# Patient Record
Sex: FEMALE | Race: WHITE | HISPANIC OR LATINO | Employment: OTHER | URBAN - METROPOLITAN AREA
[De-identification: names, ages, dates, MRNs, and addresses within clinical notes are randomized per-mention and may not be internally consistent; named-entity substitution may affect disease eponyms.]

---

## 2022-04-27 ENCOUNTER — OFFICE VISIT (OUTPATIENT)
Dept: HEPATOLOGY | Facility: CLINIC | Age: 67
End: 2022-04-27
Payer: MEDICARE

## 2022-04-27 VITALS
BODY MASS INDEX: 25.97 KG/M2 | HEART RATE: 96 BPM | DIASTOLIC BLOOD PRESSURE: 82 MMHG | HEIGHT: 57 IN | SYSTOLIC BLOOD PRESSURE: 110 MMHG | WEIGHT: 120.38 LBS

## 2022-04-27 DIAGNOSIS — K74.60 LIVER CIRRHOSIS SECONDARY TO NASH: Primary | ICD-10-CM

## 2022-04-27 DIAGNOSIS — K75.81 LIVER CIRRHOSIS SECONDARY TO NASH: Primary | ICD-10-CM

## 2022-04-27 DIAGNOSIS — I85.10 SECONDARY ESOPHAGEAL VARICES WITHOUT BLEEDING: ICD-10-CM

## 2022-04-27 PROCEDURE — 1159F PR MEDICATION LIST DOCUMENTED IN MEDICAL RECORD: ICD-10-PCS | Mod: CPTII,S$GLB,, | Performed by: INTERNAL MEDICINE

## 2022-04-27 PROCEDURE — 1101F PT FALLS ASSESS-DOCD LE1/YR: CPT | Mod: CPTII,S$GLB,, | Performed by: INTERNAL MEDICINE

## 2022-04-27 PROCEDURE — 3288F FALL RISK ASSESSMENT DOCD: CPT | Mod: CPTII,S$GLB,, | Performed by: INTERNAL MEDICINE

## 2022-04-27 PROCEDURE — 99999 PR PBB SHADOW E&M-NEW PATIENT-LVL IV: ICD-10-PCS | Mod: PBBFAC,,, | Performed by: INTERNAL MEDICINE

## 2022-04-27 PROCEDURE — 1160F PR REVIEW ALL MEDS BY PRESCRIBER/CLIN PHARMACIST DOCUMENTED: ICD-10-PCS | Mod: CPTII,S$GLB,, | Performed by: INTERNAL MEDICINE

## 2022-04-27 PROCEDURE — 3074F SYST BP LT 130 MM HG: CPT | Mod: CPTII,S$GLB,, | Performed by: INTERNAL MEDICINE

## 2022-04-27 PROCEDURE — 99999 PR PBB SHADOW E&M-NEW PATIENT-LVL IV: CPT | Mod: PBBFAC,,, | Performed by: INTERNAL MEDICINE

## 2022-04-27 PROCEDURE — 3008F BODY MASS INDEX DOCD: CPT | Mod: CPTII,S$GLB,, | Performed by: INTERNAL MEDICINE

## 2022-04-27 PROCEDURE — 1160F RVW MEDS BY RX/DR IN RCRD: CPT | Mod: CPTII,S$GLB,, | Performed by: INTERNAL MEDICINE

## 2022-04-27 PROCEDURE — 1126F AMNT PAIN NOTED NONE PRSNT: CPT | Mod: CPTII,S$GLB,, | Performed by: INTERNAL MEDICINE

## 2022-04-27 PROCEDURE — 99204 OFFICE O/P NEW MOD 45 MIN: CPT | Mod: S$GLB,,, | Performed by: INTERNAL MEDICINE

## 2022-04-27 PROCEDURE — 99204 PR OFFICE/OUTPT VISIT, NEW, LEVL IV, 45-59 MIN: ICD-10-PCS | Mod: S$GLB,,, | Performed by: INTERNAL MEDICINE

## 2022-04-27 PROCEDURE — 1101F PR PT FALLS ASSESS DOC 0-1 FALLS W/OUT INJ PAST YR: ICD-10-PCS | Mod: CPTII,S$GLB,, | Performed by: INTERNAL MEDICINE

## 2022-04-27 PROCEDURE — 3074F PR MOST RECENT SYSTOLIC BLOOD PRESSURE < 130 MM HG: ICD-10-PCS | Mod: CPTII,S$GLB,, | Performed by: INTERNAL MEDICINE

## 2022-04-27 PROCEDURE — 3288F PR FALLS RISK ASSESSMENT DOCUMENTED: ICD-10-PCS | Mod: CPTII,S$GLB,, | Performed by: INTERNAL MEDICINE

## 2022-04-27 PROCEDURE — 3079F DIAST BP 80-89 MM HG: CPT | Mod: CPTII,S$GLB,, | Performed by: INTERNAL MEDICINE

## 2022-04-27 PROCEDURE — 3079F PR MOST RECENT DIASTOLIC BLOOD PRESSURE 80-89 MM HG: ICD-10-PCS | Mod: CPTII,S$GLB,, | Performed by: INTERNAL MEDICINE

## 2022-04-27 PROCEDURE — 3008F PR BODY MASS INDEX (BMI) DOCUMENTED: ICD-10-PCS | Mod: CPTII,S$GLB,, | Performed by: INTERNAL MEDICINE

## 2022-04-27 PROCEDURE — 1126F PR PAIN SEVERITY QUANTIFIED, NO PAIN PRESENT: ICD-10-PCS | Mod: CPTII,S$GLB,, | Performed by: INTERNAL MEDICINE

## 2022-04-27 PROCEDURE — 1159F MED LIST DOCD IN RCRD: CPT | Mod: CPTII,S$GLB,, | Performed by: INTERNAL MEDICINE

## 2022-04-27 RX ORDER — CETIRIZINE HYDROCHLORIDE 10 MG/1
10 TABLET ORAL DAILY
COMMUNITY
End: 2023-11-30

## 2022-04-27 RX ORDER — FAMOTIDINE 10 MG/1
10 TABLET ORAL DAILY
COMMUNITY

## 2022-04-27 RX ORDER — FLUTICASONE PROPIONATE AND SALMETEROL 250; 50 UG/1; UG/1
POWDER RESPIRATORY (INHALATION)
COMMUNITY
Start: 2022-04-16 | End: 2023-02-06

## 2022-04-27 RX ORDER — ALBUTEROL SULFATE 90 UG/1
2 AEROSOL, METERED RESPIRATORY (INHALATION) EVERY 6 HOURS PRN
COMMUNITY
Start: 2021-11-08

## 2022-04-27 RX ORDER — CARVEDILOL 6.25 MG/1
1 TABLET ORAL DAILY
COMMUNITY
Start: 2021-06-28 | End: 2023-08-14

## 2022-04-27 RX ORDER — MONTELUKAST SODIUM 10 MG/1
1 TABLET ORAL NIGHTLY
COMMUNITY
Start: 2021-12-29 | End: 2022-12-29

## 2022-04-27 RX ORDER — AZELASTINE 1 MG/ML
1 SPRAY, METERED NASAL
COMMUNITY
Start: 2021-11-22 | End: 2023-11-30

## 2022-04-27 NOTE — PROGRESS NOTES
Subjective:     Jadyn Calderon is here for evaluation of Cirrhosis      HPI  Jadyn Calderon is here for eval and management of cirrhosis thought 2/2 AYALA. She presented initially with an esophageal variceal bleed and has required banding. No banding required on recent EGD. No confusion or ascites.  Overall she has been feeling well.    Reports recent MELD 8    Outside records reviewed    EGD 3/2022 grade 1 varices, portal HTN gastropathy        Review of Systems   Constitutional: Negative.    HENT: Negative.    Eyes: Negative.    Respiratory: Negative.    Cardiovascular: Negative.    Gastrointestinal: Negative.    Genitourinary: Negative.    Musculoskeletal: Negative.    Skin: Negative.    Neurological: Negative.    Psychiatric/Behavioral: Negative.        Objective:     Physical Exam  Vitals reviewed.   Constitutional:       General: She is not in acute distress.     Appearance: She is well-developed.   HENT:      Head: Normocephalic and atraumatic.      Mouth/Throat:      Pharynx: No oropharyngeal exudate.   Eyes:      General: No scleral icterus.        Right eye: No discharge.         Left eye: No discharge.      Conjunctiva/sclera: Conjunctivae normal.      Pupils: Pupils are equal, round, and reactive to light.   Pulmonary:      Effort: Pulmonary effort is normal. No respiratory distress.      Breath sounds: Normal breath sounds. No wheezing.   Abdominal:      General: There is no distension.      Palpations: Abdomen is soft.      Tenderness: There is no abdominal tenderness.   Neurological:      Mental Status: She is alert and oriented to person, place, and time.   Psychiatric:         Behavior: Behavior normal.         Computed MELD-Na score unavailable. Necessary lab results were not found in the last year.  Computed MELD score unavailable. Necessary lab results were not found in the last year.    Hemoglobin   Date Value Ref Range Status   11/08/2020 12.8 12.0 - 16.0 g/dL Final     Hematocrit   Date Value Ref  Range Status   11/08/2020 37.3 37.0 - 47.0 % Final     No results found for: BUN, CREATININE, GLU, CALCIUM, NA, K, CL, MG  No results found for: AST, ALT, ALKPHOS, BILITOT, ALBUMIN  No results found for: INR      Assessment/Plan:     1. 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 Secondary esophageal varices without bleeding      Jadyn Calderon is a 67 y.o. 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 No indication for liver transplantation.  -Extensive discussion with patient and daughter about cirrhosis, prognosis and management  -Explained that she can continue to f/u with Dr. Monaco and return to see me if MELD increases or she develops decompensation but will leave that up to her  -     Comprehensive Metabolic Panel; Standing  -     CBC Auto Differential; Standing  -     AFP Tumor Marker; Standing  -     Protime-INR; Standing  -     US Abdomen Limited; Standing    Secondary esophageal varices without bleeding  -Continue with secondary prophy with beta blocker and yearly EGDs    RTC in 6 months with preclinic labs and imaging; this can be cancelled if patient follows up with Dr. Jacinda Schaefer MD

## 2022-09-27 ENCOUNTER — PATIENT MESSAGE (OUTPATIENT)
Dept: HEPATOLOGY | Facility: CLINIC | Age: 67
End: 2022-09-27
Payer: MEDICARE

## 2022-10-05 ENCOUNTER — OFFICE VISIT (OUTPATIENT)
Dept: HEPATOLOGY | Facility: CLINIC | Age: 67
End: 2022-10-05
Payer: MEDICARE

## 2022-10-05 VITALS
HEART RATE: 92 BPM | BODY MASS INDEX: 25.35 KG/M2 | DIASTOLIC BLOOD PRESSURE: 70 MMHG | HEIGHT: 57 IN | WEIGHT: 117.5 LBS | SYSTOLIC BLOOD PRESSURE: 116 MMHG

## 2022-10-05 DIAGNOSIS — K75.81 LIVER CIRRHOSIS SECONDARY TO NASH: Primary | ICD-10-CM

## 2022-10-05 DIAGNOSIS — K74.60 LIVER CIRRHOSIS SECONDARY TO NASH: Primary | ICD-10-CM

## 2022-10-05 DIAGNOSIS — I85.10 SECONDARY ESOPHAGEAL VARICES WITHOUT BLEEDING: ICD-10-CM

## 2022-10-05 PROCEDURE — 1159F MED LIST DOCD IN RCRD: CPT | Mod: CPTII,S$GLB,, | Performed by: INTERNAL MEDICINE

## 2022-10-05 PROCEDURE — 99213 PR OFFICE/OUTPT VISIT, EST, LEVL III, 20-29 MIN: ICD-10-PCS | Mod: S$GLB,,, | Performed by: INTERNAL MEDICINE

## 2022-10-05 PROCEDURE — 3288F FALL RISK ASSESSMENT DOCD: CPT | Mod: CPTII,S$GLB,, | Performed by: INTERNAL MEDICINE

## 2022-10-05 PROCEDURE — 1126F AMNT PAIN NOTED NONE PRSNT: CPT | Mod: CPTII,S$GLB,, | Performed by: INTERNAL MEDICINE

## 2022-10-05 PROCEDURE — 3008F PR BODY MASS INDEX (BMI) DOCUMENTED: ICD-10-PCS | Mod: CPTII,S$GLB,, | Performed by: INTERNAL MEDICINE

## 2022-10-05 PROCEDURE — 99213 OFFICE O/P EST LOW 20 MIN: CPT | Mod: S$GLB,,, | Performed by: INTERNAL MEDICINE

## 2022-10-05 PROCEDURE — 1160F PR REVIEW ALL MEDS BY PRESCRIBER/CLIN PHARMACIST DOCUMENTED: ICD-10-PCS | Mod: CPTII,S$GLB,, | Performed by: INTERNAL MEDICINE

## 2022-10-05 PROCEDURE — 1101F PT FALLS ASSESS-DOCD LE1/YR: CPT | Mod: CPTII,S$GLB,, | Performed by: INTERNAL MEDICINE

## 2022-10-05 PROCEDURE — 1126F PR PAIN SEVERITY QUANTIFIED, NO PAIN PRESENT: ICD-10-PCS | Mod: CPTII,S$GLB,, | Performed by: INTERNAL MEDICINE

## 2022-10-05 PROCEDURE — 3074F SYST BP LT 130 MM HG: CPT | Mod: CPTII,S$GLB,, | Performed by: INTERNAL MEDICINE

## 2022-10-05 PROCEDURE — 99999 PR PBB SHADOW E&M-EST. PATIENT-LVL IV: CPT | Mod: PBBFAC,,, | Performed by: INTERNAL MEDICINE

## 2022-10-05 PROCEDURE — 3288F PR FALLS RISK ASSESSMENT DOCUMENTED: ICD-10-PCS | Mod: CPTII,S$GLB,, | Performed by: INTERNAL MEDICINE

## 2022-10-05 PROCEDURE — 3078F DIAST BP <80 MM HG: CPT | Mod: CPTII,S$GLB,, | Performed by: INTERNAL MEDICINE

## 2022-10-05 PROCEDURE — 1159F PR MEDICATION LIST DOCUMENTED IN MEDICAL RECORD: ICD-10-PCS | Mod: CPTII,S$GLB,, | Performed by: INTERNAL MEDICINE

## 2022-10-05 PROCEDURE — 3074F PR MOST RECENT SYSTOLIC BLOOD PRESSURE < 130 MM HG: ICD-10-PCS | Mod: CPTII,S$GLB,, | Performed by: INTERNAL MEDICINE

## 2022-10-05 PROCEDURE — 3008F BODY MASS INDEX DOCD: CPT | Mod: CPTII,S$GLB,, | Performed by: INTERNAL MEDICINE

## 2022-10-05 PROCEDURE — 3078F PR MOST RECENT DIASTOLIC BLOOD PRESSURE < 80 MM HG: ICD-10-PCS | Mod: CPTII,S$GLB,, | Performed by: INTERNAL MEDICINE

## 2022-10-05 PROCEDURE — 1160F RVW MEDS BY RX/DR IN RCRD: CPT | Mod: CPTII,S$GLB,, | Performed by: INTERNAL MEDICINE

## 2022-10-05 PROCEDURE — 1101F PR PT FALLS ASSESS DOC 0-1 FALLS W/OUT INJ PAST YR: ICD-10-PCS | Mod: CPTII,S$GLB,, | Performed by: INTERNAL MEDICINE

## 2022-10-05 PROCEDURE — 99999 PR PBB SHADOW E&M-EST. PATIENT-LVL IV: ICD-10-PCS | Mod: PBBFAC,,, | Performed by: INTERNAL MEDICINE

## 2022-10-05 RX ORDER — AZITHROMYCIN 250 MG/1
TABLET, FILM COATED ORAL
COMMUNITY
Start: 2022-10-05 | End: 2022-10-10

## 2022-10-05 RX ORDER — DULAGLUTIDE 1.5 MG/.5ML
1.5 INJECTION, SOLUTION SUBCUTANEOUS
COMMUNITY
Start: 2022-08-25 | End: 2023-02-21

## 2022-10-05 RX ORDER — DENOSUMAB 60 MG/ML
INJECTION SUBCUTANEOUS
COMMUNITY
Start: 2022-06-13

## 2022-10-05 RX ORDER — PREDNISONE 20 MG/1
20 TABLET ORAL DAILY
COMMUNITY
End: 2023-02-06

## 2022-10-05 RX ORDER — INSULIN DEGLUDEC 200 U/ML
INJECTION, SOLUTION SUBCUTANEOUS
COMMUNITY
Start: 2022-09-20

## 2022-10-05 RX ORDER — PROMETHAZINE HYDROCHLORIDE 12.5 MG/1
12.5 TABLET ORAL EVERY 6 HOURS PRN
COMMUNITY
End: 2023-11-30

## 2022-10-05 NOTE — PROGRESS NOTES
Subjective:     Jadyn Calderon is here for follow up of cirrhosis    HPI  Since Jadyn Calderon's last visit there have been no significant issues.  Overall feels well.    No evidence of liver decompensation: no ascites, confusion or GI bleeding.    US 8/2022  Cirrhotic liver.   -Hepatopedal flow portal vein.   -No ascites.   -Spleen normal size.    EGD 9/2022  Impression:  1. Grade 1 esophageal varices  2. Moderate portal hypertensive gastropathy  3. Retained food residue in the stomach    Plan:  --To postop recovery in stable condition  --Routine postop recovery and then discharged home in stable  --Continue current plan of care as outlined in office notes; continue Coreg  --Follow-up with Dr. Monaco in 6 months  --Repeat EGD in 1 to 2 years    Review of Systems    Objective:     Physical Exam  Vitals reviewed.   Constitutional:       General: She is not in acute distress.     Appearance: She is well-developed.   HENT:      Head: Normocephalic and atraumatic.      Mouth/Throat:      Pharynx: No oropharyngeal exudate.   Eyes:      General: No scleral icterus.        Right eye: No discharge.         Left eye: No discharge.      Conjunctiva/sclera: Conjunctivae normal.      Pupils: Pupils are equal, round, and reactive to light.   Pulmonary:      Effort: Pulmonary effort is normal. No respiratory distress.      Breath sounds: Normal breath sounds. No wheezing.   Abdominal:      General: There is no distension.      Palpations: Abdomen is soft.      Tenderness: There is no abdominal tenderness.   Neurological:      Mental Status: She is alert and oriented to person, place, and time.   Psychiatric:         Behavior: Behavior normal.       Computed MELD-Na score unavailable. Necessary lab results were not found in the last year.  Computed MELD score unavailable. Necessary lab results were not found in the last year.    Hemoglobin   Date Value Ref Range Status   11/08/2020 12.8 12.0 - 16.0 g/dL Final     Hematocrit   Date  Value Ref Range Status   11/08/2020 37.3 37.0 - 47.0 % Final     No results found for: BUN, CREATININE, GLU, CALCIUM, NA, K, CL, MG  No results found for: AST, ALT, ALKPHOS, BILITOT, ALBUMIN  No results found for: INR      Assessment/Plan:     1. 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 Secondary esophageal varices without bleeding      Jadyn Calderon is a 67 y.o. 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 Monaco 9/2024      RTC in 6 months with preclinic labs and imaging    Brittany Schaefer MD

## 2023-02-01 ENCOUNTER — HOSPITAL ENCOUNTER (OUTPATIENT)
Dept: RADIOLOGY | Facility: HOSPITAL | Age: 68
Discharge: HOME | End: 2023-02-01
Attending: INTERNAL MEDICINE
Payer: MEDICARE

## 2023-02-01 DIAGNOSIS — K75.81 LIVER CIRRHOSIS SECONDARY TO NASH: ICD-10-CM

## 2023-02-01 DIAGNOSIS — K74.60 LIVER CIRRHOSIS SECONDARY TO NASH: ICD-10-CM

## 2023-02-01 PROCEDURE — 76705 ECHO EXAM OF ABDOMEN: CPT | Mod: TC

## 2023-02-01 PROCEDURE — 76705 ECHO EXAM OF ABDOMEN: CPT | Mod: 26,,, | Performed by: RADIOLOGY

## 2023-02-01 PROCEDURE — 76705 US ABDOMEN LIMITED: ICD-10-PCS | Mod: 26,,, | Performed by: RADIOLOGY

## 2023-02-06 ENCOUNTER — OFFICE VISIT (OUTPATIENT)
Dept: HEPATOLOGY | Facility: CLINIC | Age: 68
End: 2023-02-06
Payer: MEDICARE

## 2023-02-06 VITALS
SYSTOLIC BLOOD PRESSURE: 111 MMHG | BODY MASS INDEX: 23.79 KG/M2 | WEIGHT: 110.25 LBS | HEART RATE: 84 BPM | HEIGHT: 57 IN | DIASTOLIC BLOOD PRESSURE: 65 MMHG

## 2023-02-06 DIAGNOSIS — I85.10 SECONDARY ESOPHAGEAL VARICES WITHOUT BLEEDING: ICD-10-CM

## 2023-02-06 DIAGNOSIS — K74.69 OTHER CIRRHOSIS OF LIVER: Primary | ICD-10-CM

## 2023-02-06 PROCEDURE — 1126F PR PAIN SEVERITY QUANTIFIED, NO PAIN PRESENT: ICD-10-PCS | Mod: CPTII,S$GLB,, | Performed by: INTERNAL MEDICINE

## 2023-02-06 PROCEDURE — 99213 OFFICE O/P EST LOW 20 MIN: CPT | Mod: S$GLB,,, | Performed by: INTERNAL MEDICINE

## 2023-02-06 PROCEDURE — 3078F PR MOST RECENT DIASTOLIC BLOOD PRESSURE < 80 MM HG: ICD-10-PCS | Mod: CPTII,S$GLB,, | Performed by: INTERNAL MEDICINE

## 2023-02-06 PROCEDURE — 3008F PR BODY MASS INDEX (BMI) DOCUMENTED: ICD-10-PCS | Mod: CPTII,S$GLB,, | Performed by: INTERNAL MEDICINE

## 2023-02-06 PROCEDURE — 99999 PR PBB SHADOW E&M-EST. PATIENT-LVL IV: ICD-10-PCS | Mod: PBBFAC,,, | Performed by: INTERNAL MEDICINE

## 2023-02-06 PROCEDURE — 1126F AMNT PAIN NOTED NONE PRSNT: CPT | Mod: CPTII,S$GLB,, | Performed by: INTERNAL MEDICINE

## 2023-02-06 PROCEDURE — 1159F MED LIST DOCD IN RCRD: CPT | Mod: CPTII,S$GLB,, | Performed by: INTERNAL MEDICINE

## 2023-02-06 PROCEDURE — 1159F PR MEDICATION LIST DOCUMENTED IN MEDICAL RECORD: ICD-10-PCS | Mod: CPTII,S$GLB,, | Performed by: INTERNAL MEDICINE

## 2023-02-06 PROCEDURE — 3074F SYST BP LT 130 MM HG: CPT | Mod: CPTII,S$GLB,, | Performed by: INTERNAL MEDICINE

## 2023-02-06 PROCEDURE — 3078F DIAST BP <80 MM HG: CPT | Mod: CPTII,S$GLB,, | Performed by: INTERNAL MEDICINE

## 2023-02-06 PROCEDURE — 3008F BODY MASS INDEX DOCD: CPT | Mod: CPTII,S$GLB,, | Performed by: INTERNAL MEDICINE

## 2023-02-06 PROCEDURE — 99213 PR OFFICE/OUTPT VISIT, EST, LEVL III, 20-29 MIN: ICD-10-PCS | Mod: S$GLB,,, | Performed by: INTERNAL MEDICINE

## 2023-02-06 PROCEDURE — 3074F PR MOST RECENT SYSTOLIC BLOOD PRESSURE < 130 MM HG: ICD-10-PCS | Mod: CPTII,S$GLB,, | Performed by: INTERNAL MEDICINE

## 2023-02-06 PROCEDURE — 1160F PR REVIEW ALL MEDS BY PRESCRIBER/CLIN PHARMACIST DOCUMENTED: ICD-10-PCS | Mod: CPTII,S$GLB,, | Performed by: INTERNAL MEDICINE

## 2023-02-06 PROCEDURE — 99999 PR PBB SHADOW E&M-EST. PATIENT-LVL IV: CPT | Mod: PBBFAC,,, | Performed by: INTERNAL MEDICINE

## 2023-02-06 PROCEDURE — 1160F RVW MEDS BY RX/DR IN RCRD: CPT | Mod: CPTII,S$GLB,, | Performed by: INTERNAL MEDICINE

## 2023-02-06 NOTE — PROGRESS NOTES
Subjective:     Jadyn Calderon is here for follow up of cirrhosis    HPI  Since Jadyn Calderon's last visit there have been no significant liver issues.  Overall feels well. Main issues have been in her personal life.    No evidence of liver decompensation: no ascites, confusion or GI bleeding.      Review of Systems    Objective:     Physical Exam  Vitals reviewed.   Constitutional:       General: She is not in acute distress.     Appearance: She is well-developed.   HENT:      Head: Normocephalic and atraumatic.      Mouth/Throat:      Pharynx: No oropharyngeal exudate.   Eyes:      General: No scleral icterus.        Right eye: No discharge.         Left eye: No discharge.      Conjunctiva/sclera: Conjunctivae normal.      Pupils: Pupils are equal, round, and reactive to light.   Pulmonary:      Effort: Pulmonary effort is normal. No respiratory distress.      Breath sounds: Normal breath sounds. No wheezing.   Abdominal:      General: There is no distension.      Palpations: Abdomen is soft.      Tenderness: There is no abdominal tenderness.   Neurological:      Mental Status: She is alert and oriented to person, place, and time.   Psychiatric:         Behavior: Behavior normal.       MELD-Na score: 6 at 2/1/2023  9:18 AM  MELD score: 6 at 2/1/2023  9:18 AM  Calculated from:  Serum Creatinine: 0.7 mg/dL (Using min of 1 mg/dL) at 2/1/2023  9:18 AM  Serum Sodium: 145 mmol/L (Using max of 137 mmol/L) at 2/1/2023  9:18 AM  Total Bilirubin: 0.9 mg/dL (Using min of 1 mg/dL) at 2/1/2023  9:18 AM  INR(ratio): 1.0 at 2/1/2023  9:18 AM  Age: 67 years    WBC   Date Value Ref Range Status   02/01/2023 8.99 3.90 - 12.70 K/uL Final     Hemoglobin   Date Value Ref Range Status   02/01/2023 13.1 12.0 - 16.0 g/dL Final     Hematocrit   Date Value Ref Range Status   02/01/2023 37.5 37.0 - 48.5 % Final     Platelets   Date Value Ref Range Status   02/01/2023 174 150 - 450 K/uL Final     BUN   Date Value Ref Range Status   02/01/2023  14 8 - 23 mg/dL Final     Creatinine   Date Value Ref Range Status   02/01/2023 0.7 0.5 - 1.4 mg/dL Final     Glucose   Date Value Ref Range Status   02/01/2023 91 70 - 110 mg/dL Final     Calcium   Date Value Ref Range Status   02/01/2023 8.9 8.7 - 10.5 mg/dL Final     Sodium   Date Value Ref Range Status   02/01/2023 145 136 - 145 mmol/L Final     Potassium   Date Value Ref Range Status   02/01/2023 4.2 3.5 - 5.1 mmol/L Final     Chloride   Date Value Ref Range Status   02/01/2023 111 (H) 95 - 110 mmol/L Final     AST   Date Value Ref Range Status   02/01/2023 40 10 - 40 U/L Final     ALT   Date Value Ref Range Status   02/01/2023 34 10 - 44 U/L Final     Alkaline Phosphatase   Date Value Ref Range Status   02/01/2023 109 55 - 135 U/L Final     Total Bilirubin   Date Value Ref Range Status   02/01/2023 0.9 0.1 - 1.0 mg/dL Final     Comment:     For infants and newborns, interpretation of results should be based  on gestational age, weight and in agreement with clinical  observations.    Premature Infant recommended reference ranges:  Up to 24 hours.............<8.0 mg/dL  Up to 48 hours............<12.0 mg/dL  3-5 days..................<15.0 mg/dL  6-29 days.................<15.0 mg/dL       Albumin   Date Value Ref Range Status   02/01/2023 3.7 3.5 - 5.2 g/dL Final     INR   Date Value Ref Range Status   02/01/2023 1.0 0.8 - 1.2 Final     Comment:     Coumadin Therapy:  2.0 - 3.0 for INR for all indicators except mechanical heart valves  and antiphospholipid syndromes which should use 2.5 - 3.5.           Assessment/Plan:     1. Other cirrhosis of liver    2. Secondary esophageal varices without bleeding      Jadyn Calderon is a 67 y.o. female withLiver cirrhosis secondary to AYALA      Other cirrhosis of liver-low meld, well compensated  -Continue to monitor MELD labs  -Continue with HCC surveillance  -     US Abdomen Limited; Future; Expected date: 02/06/2023  -     Comprehensive Metabolic Panel; Future; Expected  date: 02/06/2023  -     CBC Auto Differential; Future; Expected date: 02/06/2023  -     AFP Tumor Marker; Future; Expected date: 02/06/2023  -     Protime-INR; Future; Expected date: 02/06/2023    Secondary esophageal varices without bleeding  -On a beta blocker and she follows with Dr. Monaco        [facility] in 6 months with preclinic labs and imaging    Brittany Scheafer MD

## 2023-05-21 ENCOUNTER — PATIENT MESSAGE (OUTPATIENT)
Dept: HEPATOLOGY | Facility: CLINIC | Age: 68
End: 2023-05-21
Payer: MEDICARE

## 2023-08-02 ENCOUNTER — LAB VISIT (OUTPATIENT)
Dept: LAB | Facility: HOSPITAL | Age: 68
End: 2023-08-02
Attending: INTERNAL MEDICINE
Payer: MEDICARE

## 2023-08-02 ENCOUNTER — PATIENT MESSAGE (OUTPATIENT)
Dept: HEPATOLOGY | Facility: CLINIC | Age: 68
End: 2023-08-02
Payer: MEDICARE

## 2023-08-02 DIAGNOSIS — K74.69 OTHER CIRRHOSIS OF LIVER: ICD-10-CM

## 2023-08-02 LAB
AFP SERPL-MCNC: 2.6 NG/ML (ref 0–8.4)
ALBUMIN SERPL BCP-MCNC: 3.6 G/DL (ref 3.5–5.2)
ALP SERPL-CCNC: 93 U/L (ref 55–135)
ALT SERPL W/O P-5'-P-CCNC: 32 U/L (ref 10–44)
ANION GAP SERPL CALC-SCNC: 10 MMOL/L (ref 8–16)
AST SERPL-CCNC: 44 U/L (ref 10–40)
BASOPHILS # BLD AUTO: 0.07 K/UL (ref 0–0.2)
BASOPHILS NFR BLD: 0.9 % (ref 0–1.9)
BILIRUB SERPL-MCNC: 1.3 MG/DL (ref 0.1–1)
BUN SERPL-MCNC: 16 MG/DL (ref 8–23)
CALCIUM SERPL-MCNC: 9.7 MG/DL (ref 8.7–10.5)
CHLORIDE SERPL-SCNC: 108 MMOL/L (ref 95–110)
CO2 SERPL-SCNC: 21 MMOL/L (ref 23–29)
CREAT SERPL-MCNC: 0.7 MG/DL (ref 0.5–1.4)
DIFFERENTIAL METHOD: ABNORMAL
EOSINOPHIL # BLD AUTO: 0.3 K/UL (ref 0–0.5)
EOSINOPHIL NFR BLD: 4.1 % (ref 0–8)
ERYTHROCYTE [DISTWIDTH] IN BLOOD BY AUTOMATED COUNT: 14.3 % (ref 11.5–14.5)
EST. GFR  (NO RACE VARIABLE): >60 ML/MIN/1.73 M^2
GLUCOSE SERPL-MCNC: 94 MG/DL (ref 70–110)
HCT VFR BLD AUTO: 36.7 % (ref 37–48.5)
HGB BLD-MCNC: 13.1 G/DL (ref 12–16)
IMM GRANULOCYTES # BLD AUTO: 0.01 K/UL (ref 0–0.04)
IMM GRANULOCYTES NFR BLD AUTO: 0.1 % (ref 0–0.5)
INR PPP: 1.1 (ref 0.8–1.2)
LYMPHOCYTES # BLD AUTO: 1.1 K/UL (ref 1–4.8)
LYMPHOCYTES NFR BLD: 14.4 % (ref 18–48)
MCH RBC QN AUTO: 29.2 PG (ref 27–31)
MCHC RBC AUTO-ENTMCNC: 35.7 G/DL (ref 32–36)
MCV RBC AUTO: 82 FL (ref 82–98)
MONOCYTES # BLD AUTO: 0.6 K/UL (ref 0.3–1)
MONOCYTES NFR BLD: 8.3 % (ref 4–15)
NEUTROPHILS # BLD AUTO: 5.4 K/UL (ref 1.8–7.7)
NEUTROPHILS NFR BLD: 72.2 % (ref 38–73)
NRBC BLD-RTO: 0 /100 WBC
PLATELET # BLD AUTO: 139 K/UL (ref 150–450)
PMV BLD AUTO: 11.1 FL (ref 9.2–12.9)
POTASSIUM SERPL-SCNC: 3.9 MMOL/L (ref 3.5–5.1)
PROT SERPL-MCNC: 7.7 G/DL (ref 6–8.4)
PROTHROMBIN TIME: 11.5 SEC (ref 9–12.5)
RBC # BLD AUTO: 4.49 M/UL (ref 4–5.4)
SODIUM SERPL-SCNC: 139 MMOL/L (ref 136–145)
WBC # BLD AUTO: 7.51 K/UL (ref 3.9–12.7)

## 2023-08-02 PROCEDURE — 80053 COMPREHEN METABOLIC PANEL: CPT | Performed by: INTERNAL MEDICINE

## 2023-08-02 PROCEDURE — 82105 ALPHA-FETOPROTEIN SERUM: CPT | Performed by: INTERNAL MEDICINE

## 2023-08-02 PROCEDURE — 85610 PROTHROMBIN TIME: CPT | Performed by: INTERNAL MEDICINE

## 2023-08-02 PROCEDURE — 36415 COLL VENOUS BLD VENIPUNCTURE: CPT | Performed by: INTERNAL MEDICINE

## 2023-08-02 PROCEDURE — 85025 COMPLETE CBC W/AUTO DIFF WBC: CPT | Performed by: INTERNAL MEDICINE

## 2023-08-03 ENCOUNTER — PATIENT MESSAGE (OUTPATIENT)
Dept: HEPATOLOGY | Facility: CLINIC | Age: 68
End: 2023-08-03
Payer: MEDICARE

## 2023-08-04 ENCOUNTER — PATIENT MESSAGE (OUTPATIENT)
Dept: HEPATOLOGY | Facility: CLINIC | Age: 68
End: 2023-08-04
Payer: MEDICARE

## 2023-08-14 ENCOUNTER — OFFICE VISIT (OUTPATIENT)
Dept: HEPATOLOGY | Facility: CLINIC | Age: 68
End: 2023-08-14
Payer: MEDICARE

## 2023-08-14 VITALS
BODY MASS INDEX: 24.11 KG/M2 | DIASTOLIC BLOOD PRESSURE: 74 MMHG | HEIGHT: 57 IN | HEART RATE: 76 BPM | SYSTOLIC BLOOD PRESSURE: 122 MMHG | WEIGHT: 111.75 LBS

## 2023-08-14 DIAGNOSIS — K74.69 OTHER CIRRHOSIS OF LIVER: Primary | ICD-10-CM

## 2023-08-14 DIAGNOSIS — I85.10 SECONDARY ESOPHAGEAL VARICES WITHOUT BLEEDING: ICD-10-CM

## 2023-08-14 PROCEDURE — 1101F PT FALLS ASSESS-DOCD LE1/YR: CPT | Mod: CPTII,S$GLB,, | Performed by: INTERNAL MEDICINE

## 2023-08-14 PROCEDURE — 1126F AMNT PAIN NOTED NONE PRSNT: CPT | Mod: CPTII,S$GLB,, | Performed by: INTERNAL MEDICINE

## 2023-08-14 PROCEDURE — 1159F MED LIST DOCD IN RCRD: CPT | Mod: CPTII,S$GLB,, | Performed by: INTERNAL MEDICINE

## 2023-08-14 PROCEDURE — 1101F PR PT FALLS ASSESS DOC 0-1 FALLS W/OUT INJ PAST YR: ICD-10-PCS | Mod: CPTII,S$GLB,, | Performed by: INTERNAL MEDICINE

## 2023-08-14 PROCEDURE — 3008F BODY MASS INDEX DOCD: CPT | Mod: CPTII,S$GLB,, | Performed by: INTERNAL MEDICINE

## 2023-08-14 PROCEDURE — 99999 PR PBB SHADOW E&M-EST. PATIENT-LVL IV: ICD-10-PCS | Mod: PBBFAC,,, | Performed by: INTERNAL MEDICINE

## 2023-08-14 PROCEDURE — 1160F PR REVIEW ALL MEDS BY PRESCRIBER/CLIN PHARMACIST DOCUMENTED: ICD-10-PCS | Mod: CPTII,S$GLB,, | Performed by: INTERNAL MEDICINE

## 2023-08-14 PROCEDURE — 99213 PR OFFICE/OUTPT VISIT, EST, LEVL III, 20-29 MIN: ICD-10-PCS | Mod: S$GLB,,, | Performed by: INTERNAL MEDICINE

## 2023-08-14 PROCEDURE — 3074F SYST BP LT 130 MM HG: CPT | Mod: CPTII,S$GLB,, | Performed by: INTERNAL MEDICINE

## 2023-08-14 PROCEDURE — 1159F PR MEDICATION LIST DOCUMENTED IN MEDICAL RECORD: ICD-10-PCS | Mod: CPTII,S$GLB,, | Performed by: INTERNAL MEDICINE

## 2023-08-14 PROCEDURE — 3288F PR FALLS RISK ASSESSMENT DOCUMENTED: ICD-10-PCS | Mod: CPTII,S$GLB,, | Performed by: INTERNAL MEDICINE

## 2023-08-14 PROCEDURE — 3008F PR BODY MASS INDEX (BMI) DOCUMENTED: ICD-10-PCS | Mod: CPTII,S$GLB,, | Performed by: INTERNAL MEDICINE

## 2023-08-14 PROCEDURE — 1126F PR PAIN SEVERITY QUANTIFIED, NO PAIN PRESENT: ICD-10-PCS | Mod: CPTII,S$GLB,, | Performed by: INTERNAL MEDICINE

## 2023-08-14 PROCEDURE — 99213 OFFICE O/P EST LOW 20 MIN: CPT | Mod: S$GLB,,, | Performed by: INTERNAL MEDICINE

## 2023-08-14 PROCEDURE — 99999 PR PBB SHADOW E&M-EST. PATIENT-LVL IV: CPT | Mod: PBBFAC,,, | Performed by: INTERNAL MEDICINE

## 2023-08-14 PROCEDURE — 1160F RVW MEDS BY RX/DR IN RCRD: CPT | Mod: CPTII,S$GLB,, | Performed by: INTERNAL MEDICINE

## 2023-08-14 PROCEDURE — 3074F PR MOST RECENT SYSTOLIC BLOOD PRESSURE < 130 MM HG: ICD-10-PCS | Mod: CPTII,S$GLB,, | Performed by: INTERNAL MEDICINE

## 2023-08-14 PROCEDURE — 3078F PR MOST RECENT DIASTOLIC BLOOD PRESSURE < 80 MM HG: ICD-10-PCS | Mod: CPTII,S$GLB,, | Performed by: INTERNAL MEDICINE

## 2023-08-14 PROCEDURE — 3078F DIAST BP <80 MM HG: CPT | Mod: CPTII,S$GLB,, | Performed by: INTERNAL MEDICINE

## 2023-08-14 PROCEDURE — 3288F FALL RISK ASSESSMENT DOCD: CPT | Mod: CPTII,S$GLB,, | Performed by: INTERNAL MEDICINE

## 2023-08-14 RX ORDER — BUDESONIDE AND FORMOTEROL FUMARATE DIHYDRATE 160; 4.5 UG/1; UG/1
2 AEROSOL RESPIRATORY (INHALATION)
COMMUNITY
End: 2023-11-30

## 2023-08-14 RX ORDER — DULAGLUTIDE 1.5 MG/.5ML
INJECTION, SOLUTION SUBCUTANEOUS
COMMUNITY
Start: 2023-03-23

## 2023-08-14 RX ORDER — CARVEDILOL 6.25 MG/1
6.25 TABLET ORAL 2 TIMES DAILY
Qty: 60 TABLET | Refills: 5
Start: 2023-08-14

## 2023-08-14 NOTE — PROGRESS NOTES
Subjective:     Jadyn Calderon is here for follow up of cirrhosis    HPI  Since Jadyn Calderon's last visit she was helping her daughter with laundry and felt like she was bending over and may have hurt her right side.  She did contact [facility] and therefore had a ultrasound done that was unremarkable.  She denies any ongoing discomfort.  Overall feels well.     No evidence of liver decompensation: no ascites, confusion or GI bleeding.      Outside records reviewed     5/2023  Liver cirrhosis without evidence of mass. Otherwise unremarkable abdominal   ultrasound.    Review of Systems    Objective:     Physical Exam  Vitals reviewed.   Constitutional:       General: She is not in acute distress.     Appearance: She is well-developed.   HENT:      Head: Normocephalic and atraumatic.      Mouth/Throat:      Pharynx: No oropharyngeal exudate.   Eyes:      General: No scleral icterus.        Right eye: No discharge.         Left eye: No discharge.      Conjunctiva/sclera: Conjunctivae normal.      Pupils: Pupils are equal, round, and reactive to light.   Pulmonary:      Effort: Pulmonary effort is normal. No respiratory distress.      Breath sounds: Normal breath sounds. No wheezing.   Abdominal:      General: There is no distension.      Palpations: Abdomen is soft.      Tenderness: There is no abdominal tenderness.   Musculoskeletal:      Right lower leg: No edema.      Left lower leg: No edema.   Neurological:      Mental Status: She is alert and oriented to person, place, and time.   Psychiatric:         Behavior: Behavior normal.         MELD 3.0: 9 at 8/2/2023 10:09 AM  MELD-Na: 8 at 8/2/2023 10:09 AM  Calculated from:  Serum Creatinine: 0.7 mg/dL (Using min of 1 mg/dL) at 8/2/2023 10:09 AM  Serum Sodium: 139 mmol/L (Using max of 137 mmol/L) at 8/2/2023 10:09 AM  Total Bilirubin: 1.3 mg/dL at 8/2/2023 10:09 AM  Serum Albumin: 3.6 g/dL (Using max of 3.5 g/dL) at 8/2/2023 10:09 AM  INR(ratio): 1.1 at 8/2/2023  10:09 AM  Age at listing (hypothetical): 68 years  Sex: Female at 8/2/2023 10:09 AM      WBC   Date Value Ref Range Status   08/02/2023 7.51 3.90 - 12.70 K/uL Final     Hemoglobin   Date Value Ref Range Status   08/02/2023 13.1 12.0 - 16.0 g/dL Final     Hematocrit   Date Value Ref Range Status   08/02/2023 36.7 (L) 37.0 - 48.5 % Final     Platelets   Date Value Ref Range Status   08/02/2023 139 (L) 150 - 450 K/uL Final     BUN   Date Value Ref Range Status   08/02/2023 16 8 - 23 mg/dL Final     Creatinine   Date Value Ref Range Status   08/02/2023 0.7 0.5 - 1.4 mg/dL Final     Glucose   Date Value Ref Range Status   08/02/2023 94 70 - 110 mg/dL Final     Calcium   Date Value Ref Range Status   08/02/2023 9.7 8.7 - 10.5 mg/dL Final     Sodium   Date Value Ref Range Status   08/02/2023 139 136 - 145 mmol/L Final     Potassium   Date Value Ref Range Status   08/02/2023 3.9 3.5 - 5.1 mmol/L Final     Chloride   Date Value Ref Range Status   08/02/2023 108 95 - 110 mmol/L Final     AST   Date Value Ref Range Status   08/02/2023 44 (H) 10 - 40 U/L Final     ALT   Date Value Ref Range Status   08/02/2023 32 10 - 44 U/L Final     Alkaline Phosphatase   Date Value Ref Range Status   08/02/2023 93 55 - 135 U/L Final     Total Bilirubin   Date Value Ref Range Status   08/02/2023 1.3 (H) 0.1 - 1.0 mg/dL Final     Comment:     For infants and newborns, interpretation of results should be based  on gestational age, weight and in agreement with clinical  observations.    Premature Infant recommended reference ranges:  Up to 24 hours.............<8.0 mg/dL  Up to 48 hours............<12.0 mg/dL  3-5 days..................<15.0 mg/dL  6-29 days.................<15.0 mg/dL       Albumin   Date Value Ref Range Status   08/02/2023 3.6 3.5 - 5.2 g/dL Final     INR   Date Value Ref Range Status   08/02/2023 1.1 0.8 - 1.2 Final     Comment:     Coumadin Therapy:  2.0 - 3.0 for INR for all indicators except mechanical heart valves  and  antiphospholipid syndromes which should use 2.5 - 3.5.           Assessment/Plan:     1. Other cirrhosis of liver    2. Secondary esophageal varices without bleeding      Jadyn Calderon is a 68 y.o. female withCirrhosis      Cirrhosis- low MELD, well compensated; meld did increase slightly and platelets slightly lower but explained to patient nothing to do differently at this time.  Will continue to monitor.  -Continue to monitor MELD labs  -Continue with HCC surveillance    Esophageal varices-continue with secondary variceal bleeding prophylaxis  -advised that she increase carvedilol to b.i.d. dosing  -she can continue with yearly upper endoscopies with Dr. Monaco    [facility] in 3 months with preclinic labs and imaging    Brittany Streeter MD

## 2023-08-30 ENCOUNTER — PATIENT MESSAGE (OUTPATIENT)
Dept: HEPATOLOGY | Facility: CLINIC | Age: 68
End: 2023-08-30
Payer: MEDICARE

## 2023-11-20 ENCOUNTER — HOSPITAL ENCOUNTER (OUTPATIENT)
Dept: RADIOLOGY | Facility: HOSPITAL | Age: 68
Discharge: HOME | End: 2023-11-20
Attending: INTERNAL MEDICINE
Payer: MEDICARE

## 2023-11-20 DIAGNOSIS — K74.69 OTHER CIRRHOSIS OF LIVER: ICD-10-CM

## 2023-11-20 PROCEDURE — 76705 US ABDOMEN LIMITED: ICD-10-PCS | Mod: 26,,, | Performed by: RADIOLOGY

## 2023-11-20 PROCEDURE — 76705 ECHO EXAM OF ABDOMEN: CPT | Mod: 26,,, | Performed by: RADIOLOGY

## 2023-11-20 PROCEDURE — 76705 ECHO EXAM OF ABDOMEN: CPT | Mod: TC

## 2023-11-30 ENCOUNTER — OFFICE VISIT (OUTPATIENT)
Dept: HEPATOLOGY | Facility: CLINIC | Age: 68
End: 2023-11-30
Payer: MEDICARE

## 2023-11-30 VITALS — BODY MASS INDEX: 22.87 KG/M2 | HEIGHT: 57 IN | WEIGHT: 106 LBS

## 2023-11-30 DIAGNOSIS — I85.10 SECONDARY ESOPHAGEAL VARICES WITHOUT BLEEDING: ICD-10-CM

## 2023-11-30 DIAGNOSIS — K74.69 OTHER CIRRHOSIS OF LIVER: Primary | ICD-10-CM

## 2023-11-30 PROCEDURE — 3044F HG A1C LEVEL LT 7.0%: CPT | Mod: CPTII,95,, | Performed by: INTERNAL MEDICINE

## 2023-11-30 PROCEDURE — 99213 PR OFFICE/OUTPT VISIT, EST, LEVL III, 20-29 MIN: ICD-10-PCS | Mod: 95,,, | Performed by: INTERNAL MEDICINE

## 2023-11-30 PROCEDURE — 1159F MED LIST DOCD IN RCRD: CPT | Mod: CPTII,95,, | Performed by: INTERNAL MEDICINE

## 2023-11-30 PROCEDURE — 99213 OFFICE O/P EST LOW 20 MIN: CPT | Mod: 95,,, | Performed by: INTERNAL MEDICINE

## 2023-11-30 PROCEDURE — 1160F RVW MEDS BY RX/DR IN RCRD: CPT | Mod: CPTII,95,, | Performed by: INTERNAL MEDICINE

## 2023-11-30 PROCEDURE — 1159F PR MEDICATION LIST DOCUMENTED IN MEDICAL RECORD: ICD-10-PCS | Mod: CPTII,95,, | Performed by: INTERNAL MEDICINE

## 2023-11-30 PROCEDURE — 3288F FALL RISK ASSESSMENT DOCD: CPT | Mod: CPTII,95,, | Performed by: INTERNAL MEDICINE

## 2023-11-30 PROCEDURE — 3008F PR BODY MASS INDEX (BMI) DOCUMENTED: ICD-10-PCS | Mod: CPTII,95,, | Performed by: INTERNAL MEDICINE

## 2023-11-30 PROCEDURE — 3008F BODY MASS INDEX DOCD: CPT | Mod: CPTII,95,, | Performed by: INTERNAL MEDICINE

## 2023-11-30 PROCEDURE — 1160F PR REVIEW ALL MEDS BY PRESCRIBER/CLIN PHARMACIST DOCUMENTED: ICD-10-PCS | Mod: CPTII,95,, | Performed by: INTERNAL MEDICINE

## 2023-11-30 PROCEDURE — 1101F PT FALLS ASSESS-DOCD LE1/YR: CPT | Mod: CPTII,95,, | Performed by: INTERNAL MEDICINE

## 2023-11-30 PROCEDURE — 3044F PR MOST RECENT HEMOGLOBIN A1C LEVEL <7.0%: ICD-10-PCS | Mod: CPTII,95,, | Performed by: INTERNAL MEDICINE

## 2023-11-30 PROCEDURE — 3288F PR FALLS RISK ASSESSMENT DOCUMENTED: ICD-10-PCS | Mod: CPTII,95,, | Performed by: INTERNAL MEDICINE

## 2023-11-30 PROCEDURE — 1126F PR PAIN SEVERITY QUANTIFIED, NO PAIN PRESENT: ICD-10-PCS | Mod: CPTII,95,, | Performed by: INTERNAL MEDICINE

## 2023-11-30 PROCEDURE — 1126F AMNT PAIN NOTED NONE PRSNT: CPT | Mod: CPTII,95,, | Performed by: INTERNAL MEDICINE

## 2023-11-30 PROCEDURE — 1101F PR PT FALLS ASSESS DOC 0-1 FALLS W/OUT INJ PAST YR: ICD-10-PCS | Mod: CPTII,95,, | Performed by: INTERNAL MEDICINE

## 2023-11-30 NOTE — PROGRESS NOTES
Subjective:     Jadyn Calderon is here for follow up of cirrhosis    HPI  Since Jadyn Calderon's last visit there have been no significant issues.  Overall feels well.  She reports actually feeling better than previous.  She has more energy.  The only issue that she had recently was pneumonia but there been no significant liver related issues.    No evidence of liver decompensation: no ascites, confusion or GI bleeding.      Review of Systems    Objective:     Physical Exam    MELD 3.0: 9 at 11/20/2023  8:12 AM  MELD-Na: 7 at 11/20/2023  8:12 AM  Calculated from:  Serum Creatinine: 0.7 mg/dL (Using min of 1 mg/dL) at 11/20/2023  8:12 AM  Serum Sodium: 141 mmol/L (Using max of 137 mmol/L) at 11/20/2023  8:12 AM  Total Bilirubin: 0.7 mg/dL (Using min of 1 mg/dL) at 11/20/2023  8:12 AM  Serum Albumin: 3.3 g/dL at 11/20/2023  8:12 AM  INR(ratio): 1.1 at 11/20/2023  8:12 AM  Age at listing (hypothetical): 68 years  Sex: Female at 11/20/2023  8:12 AM      WBC   Date Value Ref Range Status   11/20/2023 8.30 3.90 - 12.70 K/uL Final     Hemoglobin   Date Value Ref Range Status   11/20/2023 12.6 12.0 - 16.0 g/dL Final     Hematocrit   Date Value Ref Range Status   11/20/2023 35.3 (L) 37.0 - 48.5 % Final     Platelets   Date Value Ref Range Status   11/20/2023 236 150 - 450 K/uL Final     BUN   Date Value Ref Range Status   11/20/2023 15 8 - 23 mg/dL Final     Creatinine   Date Value Ref Range Status   11/20/2023 0.7 0.5 - 1.4 mg/dL Final     Glucose   Date Value Ref Range Status   11/20/2023 77 70 - 110 mg/dL Final     Calcium   Date Value Ref Range Status   11/20/2023 9.5 8.7 - 10.5 mg/dL Final     Sodium   Date Value Ref Range Status   11/20/2023 141 136 - 145 mmol/L Final     Potassium   Date Value Ref Range Status   11/20/2023 3.8 3.5 - 5.1 mmol/L Final     Chloride   Date Value Ref Range Status   11/20/2023 108 95 - 110 mmol/L Final     AST   Date Value Ref Range Status   11/20/2023 31 10 - 40 U/L Final     ALT   Date Value  Ref Range Status   11/20/2023 27 10 - 44 U/L Final     Alkaline Phosphatase   Date Value Ref Range Status   11/20/2023 100 55 - 135 U/L Final     Total Bilirubin   Date Value Ref Range Status   11/20/2023 0.7 0.1 - 1.0 mg/dL Final     Comment:     For infants and newborns, interpretation of results should be based  on gestational age, weight and in agreement with clinical  observations.    Premature Infant recommended reference ranges:  Up to 24 hours.............<8.0 mg/dL  Up to 48 hours............<12.0 mg/dL  3-5 days..................<15.0 mg/dL  6-29 days.................<15.0 mg/dL       Albumin   Date Value Ref Range Status   11/20/2023 3.3 (L) 3.5 - 5.2 g/dL Final     INR   Date Value Ref Range Status   11/20/2023 1.1 0.8 - 1.2 Final     Comment:     Coumadin Therapy:  2.0 - 3.0 for INR for all indicators except mechanical heart valves  and antiphospholipid syndromes which should use 2.5 - 3.5.           Assessment/Plan:     1. Other cirrhosis of liver      Jadyn Calderon is a 68 y.o. female withCirrhosis      Cirrhosis- low MELD, well compensated  -Continue to monitor MELD labs  -Continue with HCC surveillance    Esophageal varices-continue with secondary prophy with Dr. Monaco    [facility] in 6 months with preclinic labs and imaging    The patient location is: [place]  The chief complaint leading to consultation is: cirrhosis    Visit type: audiovisual    Face to Face time with patient: 10 minutes  20 minutes of total time spent on the encounter, which includes face to face time and non-face to face time preparing to see the patient (eg, review of tests), Obtaining and/or reviewing separately obtained history, Documenting clinical information in the electronic or other health record, Independently interpreting results (not separately reported) and communicating results to the patient/family/caregiver, or Care coordination (not separately reported).         Each patient to whom he or she provides medical services  by telemedicine is:  (1) informed of the relationship between the physician and patient and the respective role of any other health care provider with respect to management of the patient; and (2) notified that he or she may decline to receive medical services by telemedicine and may withdraw from such care at any time.    Notes:       Brittany Streeter MD

## 2024-01-29 ENCOUNTER — TELEPHONE (OUTPATIENT)
Dept: HEPATOLOGY | Facility: CLINIC | Age: 69
End: 2024-01-29
Payer: MEDICARE

## 2024-01-29 ENCOUNTER — PATIENT MESSAGE (OUTPATIENT)
Dept: HEPATOLOGY | Facility: CLINIC | Age: 69
End: 2024-01-29
Payer: MEDICARE

## 2024-01-29 NOTE — TELEPHONE ENCOUNTER
Contacted patient to inform her that per Dr. Streeter she can go to [place] to which she voiced understanding.

## 2024-01-29 NOTE — TELEPHONE ENCOUNTER
----- Message from Ulises Parker sent at 1/29/2024 11:05 AM CST -----  Contact: Jadyn Salamanca is requesting a call from nurse to request an order for an Ultra sound for a [facility] appt. Pt is requesting a call from nurse to discuss if the provider can check her out before a trip to [place]. Please call pt back at [phone]

## 2024-01-29 NOTE — TELEPHONE ENCOUNTER
Returned patient's call, she states that her family has planned a trip to [place] on 2/14/24 she would like to know if it's safe for her to go. Please advise.

## 2024-03-17 ENCOUNTER — PATIENT MESSAGE (OUTPATIENT)
Dept: HEPATOLOGY | Facility: CLINIC | Age: 69
End: 2024-03-17
Payer: MEDICARE

## 2024-04-25 ENCOUNTER — PATIENT MESSAGE (OUTPATIENT)
Dept: HEPATOLOGY | Facility: CLINIC | Age: 69
End: 2024-04-25
Payer: MEDICARE

## 2024-06-12 ENCOUNTER — HOSPITAL ENCOUNTER (OUTPATIENT)
Dept: RADIOLOGY | Facility: HOSPITAL | Age: 69
Discharge: HOME | End: 2024-06-12
Attending: INTERNAL MEDICINE
Payer: MEDICARE

## 2024-06-12 ENCOUNTER — OFFICE VISIT (OUTPATIENT)
Dept: HEPATOLOGY | Facility: CLINIC | Age: 69
End: 2024-06-12
Payer: MEDICARE

## 2024-06-12 VITALS
WEIGHT: 111.13 LBS | HEIGHT: 57 IN | SYSTOLIC BLOOD PRESSURE: 153 MMHG | BODY MASS INDEX: 23.98 KG/M2 | DIASTOLIC BLOOD PRESSURE: 81 MMHG | HEART RATE: 80 BPM

## 2024-06-12 DIAGNOSIS — I85.10 SECONDARY ESOPHAGEAL VARICES WITHOUT BLEEDING: ICD-10-CM

## 2024-06-12 DIAGNOSIS — R18.8 OTHER ASCITES: ICD-10-CM

## 2024-06-12 DIAGNOSIS — F43.20 ADJUSTMENT DISORDER, UNSPECIFIED TYPE: ICD-10-CM

## 2024-06-12 DIAGNOSIS — K74.69 OTHER CIRRHOSIS OF LIVER: Primary | ICD-10-CM

## 2024-06-12 DIAGNOSIS — K74.69 OTHER CIRRHOSIS OF LIVER: ICD-10-CM

## 2024-06-12 PROCEDURE — 3077F SYST BP >= 140 MM HG: CPT | Mod: CPTII,S$GLB,, | Performed by: INTERNAL MEDICINE

## 2024-06-12 PROCEDURE — 3008F BODY MASS INDEX DOCD: CPT | Mod: CPTII,S$GLB,, | Performed by: INTERNAL MEDICINE

## 2024-06-12 PROCEDURE — 99214 OFFICE O/P EST MOD 30 MIN: CPT | Mod: S$GLB,,, | Performed by: INTERNAL MEDICINE

## 2024-06-12 PROCEDURE — 99999 PR PBB SHADOW E&M-EST. PATIENT-LVL IV: CPT | Mod: PBBFAC,,, | Performed by: INTERNAL MEDICINE

## 2024-06-12 PROCEDURE — 3079F DIAST BP 80-89 MM HG: CPT | Mod: CPTII,S$GLB,, | Performed by: INTERNAL MEDICINE

## 2024-06-12 PROCEDURE — 76705 ECHO EXAM OF ABDOMEN: CPT | Mod: 26,,, | Performed by: RADIOLOGY

## 2024-06-12 PROCEDURE — 1126F AMNT PAIN NOTED NONE PRSNT: CPT | Mod: CPTII,S$GLB,, | Performed by: INTERNAL MEDICINE

## 2024-06-12 PROCEDURE — 76705 ECHO EXAM OF ABDOMEN: CPT | Mod: TC

## 2024-06-12 PROCEDURE — 1160F RVW MEDS BY RX/DR IN RCRD: CPT | Mod: CPTII,S$GLB,, | Performed by: INTERNAL MEDICINE

## 2024-06-12 PROCEDURE — 1159F MED LIST DOCD IN RCRD: CPT | Mod: CPTII,S$GLB,, | Performed by: INTERNAL MEDICINE

## 2024-06-12 RX ORDER — MONTELUKAST SODIUM 10 MG/1
1 TABLET ORAL NIGHTLY
COMMUNITY
Start: 2024-01-11 | End: 2025-01-10

## 2024-06-12 RX ORDER — FLUTICASONE FUROATE, UMECLIDINIUM BROMIDE AND VILANTEROL TRIFENATATE 200; 62.5; 25 UG/1; UG/1; UG/1
1 POWDER RESPIRATORY (INHALATION)
COMMUNITY
Start: 2024-04-18

## 2024-06-12 RX ORDER — FLUTICASONE PROPIONATE 50 MCG
2 SPRAY, SUSPENSION (ML) NASAL
COMMUNITY
Start: 2024-01-11 | End: 2024-07-09

## 2024-06-12 RX ORDER — AZITHROMYCIN 250 MG/1
1 TABLET, FILM COATED ORAL EVERY MORNING
COMMUNITY
Start: 2024-06-11

## 2024-06-12 NOTE — PROGRESS NOTES
Subjective:     Jadyn Calderon is here for follow up of cirrhosis    HPI  Since Jadyn Calderon's last visit  she had lost weight and there were some concerns around that.  She has been increasing her protein intake as well as supplementation and her weight is back to her usual.  She also has noticed decrease in muscle mass and now she has working with a . Overall feels well.   She does  become tearful during the interview because of concerns around her medical issues and limitations as relates to the things that she can do and eat because of her medical conditions.  She reports that for the most part she remains active and stays busy but at times it can feel overwhelming for her.    No evidence of liver decompensation: no ascites, confusion or GI bleeding.      Review of Systems    Objective:     Physical Exam  Vitals reviewed.   Constitutional:       General: She is not in acute distress.     Appearance: She is well-developed.   HENT:      Head: Normocephalic and atraumatic.      Mouth/Throat:      Pharynx: No oropharyngeal exudate.   Eyes:      General: No scleral icterus.        Right eye: No discharge.         Left eye: No discharge.      Conjunctiva/sclera: Conjunctivae normal.      Pupils: Pupils are equal, round, and reactive to light.   Pulmonary:      Effort: Pulmonary effort is normal. No respiratory distress.      Breath sounds: Normal breath sounds. No wheezing.   Abdominal:      General: There is no distension.      Palpations: Abdomen is soft.      Tenderness: There is no abdominal tenderness.   Musculoskeletal:      Right lower leg: No edema.      Left lower leg: No edema.   Neurological:      Mental Status: She is alert and oriented to person, place, and time.   Psychiatric:         Behavior: Behavior normal.         MELD 3.0: 9 at 6/12/2024  9:03 AM  MELD-Na: 8 at 6/12/2024  9:03 AM  Calculated from:  Serum Creatinine: 0.7 mg/dL (Using min of 1 mg/dL) at 6/12/2024  9:03 AM  Serum Sodium: 141  mmol/L (Using max of 137 mmol/L) at 6/12/2024  9:03 AM  Total Bilirubin: 1.1 mg/dL at 6/12/2024  9:03 AM  Serum Albumin: 3.8 g/dL (Using max of 3.5 g/dL) at 6/12/2024  9:03 AM  INR(ratio): 1.1 at 6/12/2024  9:03 AM  Age at listing (hypothetical): 69 years  Sex: Female at 6/12/2024  9:03 AM      WBC   Date Value Ref Range Status   06/12/2024 8.57 3.90 - 12.70 K/uL Final     Hemoglobin   Date Value Ref Range Status   06/12/2024 14.2 12.0 - 16.0 g/dL Final     Hematocrit   Date Value Ref Range Status   06/12/2024 40.1 37.0 - 48.5 % Final     Platelets   Date Value Ref Range Status   06/12/2024 133 (L) 150 - 450 K/uL Final     BUN   Date Value Ref Range Status   06/12/2024 16 8 - 23 mg/dL Final     Creatinine   Date Value Ref Range Status   06/12/2024 0.7 0.5 - 1.4 mg/dL Final     Glucose   Date Value Ref Range Status   06/12/2024 71 70 - 110 mg/dL Final     Calcium   Date Value Ref Range Status   06/12/2024 9.5 8.7 - 10.5 mg/dL Final     Sodium   Date Value Ref Range Status   06/12/2024 141 136 - 145 mmol/L Final     Potassium   Date Value Ref Range Status   06/12/2024 3.8 3.5 - 5.1 mmol/L Final     Chloride   Date Value Ref Range Status   06/12/2024 109 95 - 110 mmol/L Final     AST   Date Value Ref Range Status   06/12/2024 50 (H) 10 - 40 U/L Final     ALT   Date Value Ref Range Status   06/12/2024 78 (H) 10 - 44 U/L Final     Alkaline Phosphatase   Date Value Ref Range Status   06/12/2024 77 55 - 135 U/L Final     Total Bilirubin   Date Value Ref Range Status   06/12/2024 1.1 (H) 0.1 - 1.0 mg/dL Final     Comment:     For infants and newborns, interpretation of results should be based  on gestational age, weight and in agreement with clinical  observations.    Premature Infant recommended reference ranges:  Up to 24 hours.............<8.0 mg/dL  Up to 48 hours............<12.0 mg/dL  3-5 days..................<15.0 mg/dL  6-29 days.................<15.0 mg/dL       Albumin   Date Value Ref Range Status   06/12/2024  3.8 3.5 - 5.2 g/dL Final     INR   Date Value Ref Range Status   06/12/2024 1.1 0.8 - 1.2 Final     Comment:     Coumadin Therapy:  2.0 - 3.0 for INR for all indicators except mechanical heart valves  and antiphospholipid syndromes which should use 2.5 - 3.5.       US 6/2024  FINDINGS:  Liver: Normal in size measuring 12.7 cm. The liver demonstrates a heterogeneous echotexture with a nodular peripheral contour consistent with cirrhosis.  No focal liver lesions.  Trace free fluid seen adjacent to the right hepatic lobe.     Biliary system: The gallbladder demonstrates no evidence of calculi. No gallbladder wall thickening.  No sonographic Guthrie sign. No pericholecystic fluid. The common duct is not dilated, measuring 5.1 mm. No intrahepatic ductal dilatation.     Pancreas: The visualized portions of pancreas appear normal     Spleen: The spleen measures 12.1 x 3.0 cm.     Miscellaneous: Trace perihepatic ascites..     Impression:     1. Cirrhotic liver with trace perihepatic ascites.  No focal liver lesions.  .      Assessment/Plan:     1. Other cirrhosis of liver    2. Secondary esophageal varices without bleeding    3. Adjustment disorder, unspecified type    4. Other ascites      Jadyn Calderon is a 69 y.o. female withCirrhosis    Other cirrhosis of liver-  Low MELD; patient has the 1st signs of minor decompensation in the form of trace ascites on ultrasound but nothing evident clinically.  -  Continue to monitor meld  - continue HCC surveillance  -     US Abdomen Limited; Future; Expected date: 06/12/2024  -     Comprehensive Metabolic Panel; Future; Expected date: 06/12/2024  -     CBC Auto Differential; Future; Expected date: 06/12/2024  -     AFP Tumor Marker; Future; Expected date: 06/12/2024  -     Protime-INR; Future; Expected date: 06/12/2024    Secondary esophageal varices without bleeding- continue secondary prophylaxis  - continue on beta-blocker  - she can continue with yearly endoscopies with her  gastroenterologist, Dr. Monaco    Adjustment disorder, unspecified type- suspect patient is having difficulty with managing her diagnoses.  Based on her tearful in his at today's visit I did recommend that she consider seeing a therapist to help with her emotions and coping.    Other ascites-  Minimal; will not start diuretics at this time  - advised patient maintain a low-salt diet  - monitor weights and clinically for increasing fluid; advised that she contact me if there is any concern and we can consider starting diuretics      RTC in 6 months with preclinic labs and imaging    Brittany Streeter MD

## 2024-07-24 ENCOUNTER — PATIENT MESSAGE (OUTPATIENT)
Dept: HEPATOLOGY | Facility: CLINIC | Age: 69
End: 2024-07-24
Payer: MEDICARE

## 2024-08-13 ENCOUNTER — TELEPHONE (OUTPATIENT)
Dept: HEPATOLOGY | Facility: CLINIC | Age: 69
End: 2024-08-13
Payer: MEDICARE

## 2024-08-14 ENCOUNTER — PATIENT MESSAGE (OUTPATIENT)
Dept: HEPATOLOGY | Facility: CLINIC | Age: 69
End: 2024-08-14
Payer: MEDICARE

## 2024-08-14 ENCOUNTER — TELEPHONE (OUTPATIENT)
Dept: HEPATOLOGY | Facility: CLINIC | Age: 69
End: 2024-08-14
Payer: MEDICARE

## 2024-08-14 DIAGNOSIS — R60.0 BILATERAL LOWER EXTREMITY EDEMA: Primary | ICD-10-CM

## 2024-08-14 RX ORDER — SPIRONOLACTONE 50 MG/1
50 TABLET, FILM COATED ORAL DAILY
Qty: 30 TABLET | Refills: 5 | Status: SHIPPED | OUTPATIENT
Start: 2024-08-14 | End: 2025-08-14

## 2024-08-14 NOTE — TELEPHONE ENCOUNTER
----- Message from Leona Jose Luis sent at 8/14/2024 10:26 AM CDT -----  Regarding: Appt  Contact: Pt  [phone]          Name of Caller:   Jadyn     Contact Preference: [phone]    Nature of Call:  Would like to re establish care with new provider due to Dr. Streeter leaving the organization  states she was referred to Dr. Tolliver

## 2024-08-14 NOTE — TELEPHONE ENCOUNTER
Returned call to the patient.  Patient wants to schedule appt with Dr. Tolliver to re-establish care as Dr. Streeter is leaving.  Scheduled appt to the next available 9/6/2024.  Patient confirmed and agreed with the appt.  Reminder letter mailed.

## 2024-08-19 ENCOUNTER — HOSPITAL ENCOUNTER (OUTPATIENT)
Dept: RADIOLOGY | Facility: HOSPITAL | Age: 69
Discharge: HOME | End: 2024-08-19
Attending: INTERNAL MEDICINE
Payer: MEDICARE

## 2024-08-19 DIAGNOSIS — K74.69 OTHER CIRRHOSIS OF LIVER: ICD-10-CM

## 2024-08-19 PROCEDURE — 76705 ECHO EXAM OF ABDOMEN: CPT | Mod: TC

## 2024-08-19 PROCEDURE — 76705 ECHO EXAM OF ABDOMEN: CPT | Mod: 26,,, | Performed by: RADIOLOGY

## 2024-09-06 ENCOUNTER — TELEPHONE (OUTPATIENT)
Dept: HEPATOLOGY | Facility: CLINIC | Age: 69
End: 2024-09-06

## 2024-09-06 ENCOUNTER — OFFICE VISIT (OUTPATIENT)
Dept: HEPATOLOGY | Facility: CLINIC | Age: 69
End: 2024-09-06
Payer: MEDICARE

## 2024-09-06 DIAGNOSIS — K74.60 LIVER CIRRHOSIS SECONDARY TO NASH: ICD-10-CM

## 2024-09-06 DIAGNOSIS — K74.60 CIRRHOSIS OF LIVER WITHOUT ASCITES, UNSPECIFIED HEPATIC CIRRHOSIS TYPE: Primary | ICD-10-CM

## 2024-09-06 DIAGNOSIS — K75.81 LIVER CIRRHOSIS SECONDARY TO NASH: ICD-10-CM

## 2024-09-06 NOTE — PROGRESS NOTES
Hepatology clinic [facility] [place]    9/6/2024    THIS IS A VIDEO VISIT, THEREFORE, SOME ELEMENTS OF THE PHYSICAL EXAM, SUCH AS VITAL SIGNS, HEART SOUNDS OR BREATH SOUNDS ARE NOT INCLUDED.  ANY SYMPTOMS OR SIGNS THAT WERE VISUALIZED OR STATED BY THE PATIENT MAY BE INCLUDED IN THIS NOTE..  The patient location is: home  The chief complaint leading to consultation is: followup of Dr. Streeter patient    Visit type: audiovisual    Face to Face time with patient: 10 min  15 minutes of total time spent on the encounter, which includes face to face time and non-face to face time preparing to see the patient (eg, review of tests), Obtaining and/or reviewing separately obtained history, Documenting clinical information in the electronic or other health record, Independently interpreting results (not separately reported) and communicating results to the patient/family/caregiver, or Care coordination (not separately reported).     Each patient to whom he or she provides medical services by telemedicine is:  (1) informed of the relationship between the physician and patient and the respective role of any other health care provider with respect to management of the patient; and (2) notified that he or she may decline to receive medical services by telemedicine and may withdraw from such care at any time.    Notes:          Subjective:     Jadyn Calderon is here for follow up of cirrhosis    HPI  Since Jadyn Calderon's last visit  she had lost weight and there were some concerns around that.  She has been increasing her protein intake as well as supplementation and her weight is back to her usual.  She also has noticed decrease in muscle mass and now she has working with a . Overall feels well.   She does  become tearful during the interview because of concerns around her medical issues and limitations as relates to the things that she can do and eat because of her medical conditions.  She reports that for the most part she remains active and  stays busy but at times it can feel overwhelming for her.    No evidence of liver decompensation: no ascites, confusion or GI bleeding.      Review of Systems    Objective:     Physical Exam  Vitals reviewed.   Constitutional:       General: She is not in acute distress.     Appearance: She is well-developed.   HENT:      Head: Normocephalic and atraumatic.      Mouth/Throat:      Pharynx: No oropharyngeal exudate.   Eyes:      General: No scleral icterus.        Right eye: No discharge.         Left eye: No discharge.      Conjunctiva/sclera: Conjunctivae normal.      Pupils: Pupils are equal, round, and reactive to light.   Pulmonary:      Effort: Pulmonary effort is normal. No respiratory distress.      Breath sounds: Normal breath sounds. No wheezing.   Abdominal:      General: There is no distension.      Palpations: Abdomen is soft.      Tenderness: There is no abdominal tenderness.   Musculoskeletal:      Right lower leg: No edema.      Left lower leg: No edema.   Neurological:      Mental Status: She is alert and oriented to person, place, and time.   Psychiatric:         Behavior: Behavior normal.         MELD 3.0: 9 at 6/12/2024  9:03 AM  MELD-Na: 8 at 6/12/2024  9:03 AM  Calculated from:  Serum Creatinine: 0.7 mg/dL (Using min of 1 mg/dL) at 6/12/2024  9:03 AM  Serum Sodium: 141 mmol/L (Using max of 137 mmol/L) at 6/12/2024  9:03 AM  Total Bilirubin: 1.1 mg/dL at 6/12/2024  9:03 AM  Serum Albumin: 3.8 g/dL (Using max of 3.5 g/dL) at 6/12/2024  9:03 AM  INR(ratio): 1.1 at 6/12/2024  9:03 AM  Age at listing (hypothetical): 69 years  Sex: Female at 6/12/2024  9:03 AM      WBC   Date Value Ref Range Status   06/12/2024 8.57 3.90 - 12.70 K/uL Final     Hemoglobin   Date Value Ref Range Status   06/12/2024 14.2 12.0 - 16.0 g/dL Final     Hematocrit   Date Value Ref Range Status   06/12/2024 40.1 37.0 - 48.5 % Final     Platelets   Date Value Ref Range Status   06/12/2024 133 (L) 150 - 450 K/uL Final     BUN    Date Value Ref Range Status   06/12/2024 16 8 - 23 mg/dL Final     Creatinine   Date Value Ref Range Status   06/12/2024 0.7 0.5 - 1.4 mg/dL Final     Glucose   Date Value Ref Range Status   06/12/2024 71 70 - 110 mg/dL Final     Calcium   Date Value Ref Range Status   06/12/2024 9.5 8.7 - 10.5 mg/dL Final     Sodium   Date Value Ref Range Status   06/12/2024 141 136 - 145 mmol/L Final     Potassium   Date Value Ref Range Status   06/12/2024 3.8 3.5 - 5.1 mmol/L Final     Chloride   Date Value Ref Range Status   06/12/2024 109 95 - 110 mmol/L Final     AST   Date Value Ref Range Status   06/12/2024 50 (H) 10 - 40 U/L Final     ALT   Date Value Ref Range Status   06/12/2024 78 (H) 10 - 44 U/L Final     Alkaline Phosphatase   Date Value Ref Range Status   06/12/2024 77 55 - 135 U/L Final     Total Bilirubin   Date Value Ref Range Status   06/12/2024 1.1 (H) 0.1 - 1.0 mg/dL Final     Comment:     For infants and newborns, interpretation of results should be based  on gestational age, weight and in agreement with clinical  observations.    Premature Infant recommended reference ranges:  Up to 24 hours.............<8.0 mg/dL  Up to 48 hours............<12.0 mg/dL  3-5 days..................<15.0 mg/dL  6-29 days.................<15.0 mg/dL       Albumin   Date Value Ref Range Status   06/12/2024 3.8 3.5 - 5.2 g/dL Final     INR   Date Value Ref Range Status   08/30/2024 1.1 0.9 - 1.2 Final     Comment:     Reference interval is for non-anticoagulated patients.  Suggested INR therapeutic range for Vitamin K  antagonist therapy:     Standard Dose (moderate intensity                    therapeutic range):       2.0 - 3.0     Higher intensity therapeutic range       2.5 - 3.5   06/12/2024 1.1 0.8 - 1.2 Final     Comment:     Coumadin Therapy:  2.0 - 3.0 for INR for all indicators except mechanical heart valves  and antiphospholipid syndromes which should use 2.5 - 3.5.       US 6/2024  FINDINGS:  Liver: Normal in size  measuring 12.7 cm. The liver demonstrates a heterogeneous echotexture with a nodular peripheral contour consistent with cirrhosis.  No focal liver lesions.  Trace free fluid seen adjacent to the right hepatic lobe.     Biliary system: The gallbladder demonstrates no evidence of calculi. No gallbladder wall thickening.  No sonographic Guthrie sign. No pericholecystic fluid. The common duct is not dilated, measuring 5.1 mm. No intrahepatic ductal dilatation.     Pancreas: The visualized portions of pancreas appear normal     Spleen: The spleen measures 12.1 x 3.0 cm.     Miscellaneous: Trace perihepatic ascites..     Impression:     1. Cirrhotic liver with trace perihepatic ascites.  No focal liver lesions.  .      Assessment/Plan:     No diagnosis found.    Jadyn Calderon is a 69 y.o. female withNo chief complaint on file.    ALLERGIC TO SPIRONOLACTONE    Other cirrhosis of liver-  Low MELD; patient has the 1st signs of minor decompensation in the form of trace ascites on ultrasound but nothing evident clinically.    MELD 3.0: 9 at 6/12/2024  9:03 AM  MELD-Na: 8 at 6/12/2024  9:03 AM  Calculated from:  Serum Creatinine: 0.7 mg/dL (Using min of 1 mg/dL) at 6/12/2024  9:03 AM  Serum Sodium: 141 mmol/L (Using max of 137 mmol/L) at 6/12/2024  9:03 AM  Total Bilirubin: 1.1 mg/dL at 6/12/2024  9:03 AM  Serum Albumin: 3.8 g/dL (Using max of 3.5 g/dL) at 6/12/2024  9:03 AM  INR(ratio): 1.1 at 6/12/2024  9:03 AM  Age at listing (hypothetical): 69 years  Sex: Female at 6/12/2024  9:03 AM     -  Continue to monitor meld  - continue HCC surveillance - neg 8/19/24 ultrasound no focal lesion, AFP 4.4 on 6/12/24.   -     US Abdomen Limited; Future; Expected date: Feb 2025  -     Comprehensive Metabolic Panel; Future; Expected date: Feb 2025  -     CBC Auto Differential; Future; Expected date: Feb 2025  -     AFP Tumor Marker; Future; Expected date: Feb 2025  -     Protime-INR; Future; Expected date: Feb 2025    Secondary esophageal  varices without bleeding- continue secondary prophylaxis  - continue on beta-blocker  - she can continue with yearly endoscopies with her gastroenterologist, Dr. Monaco    Adjustment disorder, unspecified type- suspect patient is having difficulty with managing her diagnoses.  Based on her tearful in his at today's visit I did recommend that she consider seeing a therapist to help with her emotions and coping.    Other ascites-  Minimal; will not start diuretics at this time  - advised patient maintain a low-salt diet  - monitor weights and clinically for increasing fluid; advised that she contact me if there is any concern and we can consider starting diuretics    PLAN:  -     US Abdomen Limited; Future; Expected date: Feb 2025  -     Comprehensive Metabolic Panel; Future; Expected date: Feb 2025  -     CBC Auto Differential; Future; Expected date: Feb 2025  -     AFP Tumor Marker; Future; Expected date: Feb 2025  -     Protime-INR; Future; Expected date: Feb 2025  RTC in 6 months with preclinic labs and imaging    Emilie Tolliver MD

## 2024-09-06 NOTE — Clinical Note
PLAN: -     US Abdomen Limited; Future; Expected date: Feb 2025 -     Comprehensive Metabolic Panel; Future; Expected date: Feb 2025 -     CBC Auto Differential; Future; Expected date: Feb 2025 -     AFP Tumor Marker; Future; Expected date: Feb 2025 -     Protime-INR; Future; Expected date: Feb 2025 RTC in 6 months with preclinic labs and imaging

## 2024-09-06 NOTE — TELEPHONE ENCOUNTER
----- Message from Steph Tamayo sent at 9/6/2024  7:50 AM CDT -----  Type:  Sooner Apoointment Request    Caller is requesting a sooner appointment.  Caller declined first available appointment listed below.  Caller will not accept being placed on the waitlist and is requesting a message be sent to doctor.  Name of Caller:pt   When is the first available appointment?  Symptoms:f/u   Would the patient rather a call back or a response via [facility]? Call   Best Call Back Number:[phone]  Additional Information: states they would like to reschedule appt

## 2024-09-09 DIAGNOSIS — K74.60 CIRRHOSIS OF LIVER WITHOUT ASCITES, UNSPECIFIED HEPATIC CIRRHOSIS TYPE: Primary | ICD-10-CM

## 2024-10-14 ENCOUNTER — NURSE TRIAGE (OUTPATIENT)
Dept: ADMINISTRATIVE | Facility: CLINIC | Age: 69
End: 2024-10-14
Payer: MEDICARE

## 2024-10-14 NOTE — TELEPHONE ENCOUNTER
Pt is looking for a podiatrist. She has an ingrown toenail right pinky toe. She think it may be infected. She is a dm and has cirrhosis of the liver. Pt stated the toe is painful. It has some redness on the outside of the toe. Pt stated she has been having moderate pain for more than 3 days. Care advice recommends pt see MD within 3 days. Pt connected to the appt desk.   Reason for Disposition   MODERATE pain (e.g., limping, interferes with normal activities) and present > 3 days    Additional Information   Negative: Patient sounds very sick or weak to the triager   Negative: Looks infected (spreading redness, red streak, pus) and fever   Negative: Red streaking and longer than 1 inch (2.5 cm)   Negative: Skin around the nail has become red and larger than 2 inches (5 cm)   Negative: Entire toe is red or swollen   Negative: Yellow pus seen in skin around toenail (cuticle area), or pus seen under toenail    Protocols used: Toenail - Ingrown-A-[place]

## 2025-02-11 ENCOUNTER — TELEPHONE (OUTPATIENT)
Dept: HEPATOLOGY | Facility: CLINIC | Age: 70
End: 2025-02-11
Payer: MEDICARE

## 2025-02-11 ENCOUNTER — HOSPITAL ENCOUNTER (OUTPATIENT)
Dept: RADIOLOGY | Facility: HOSPITAL | Age: 70
Discharge: HOME | End: 2025-02-11
Attending: INTERNAL MEDICINE
Payer: MEDICARE

## 2025-02-11 DIAGNOSIS — K74.60 CIRRHOSIS OF LIVER WITHOUT ASCITES, UNSPECIFIED HEPATIC CIRRHOSIS TYPE: ICD-10-CM

## 2025-02-11 PROCEDURE — 76705 ECHO EXAM OF ABDOMEN: CPT | Mod: 26,,, | Performed by: RADIOLOGY

## 2025-02-11 PROCEDURE — 76705 ECHO EXAM OF ABDOMEN: CPT | Mod: TC

## 2025-02-11 NOTE — TELEPHONE ENCOUNTER
Spoke with the pt regarding pt concern about recent lab and us. I assured the pt that once the result is out we will let her know right away. Pt stated that she worry a lot every time she get some tests. Pt asked if its better to schedule appointment in person or virtual. I advised the pt to come in person since she haven't met Dr. Tolliver in person, pt agrees. Pt was scheduled in person f/u with Dr. Tolliver in 4/4/25 at 11:30 am. Pt verbalized understanding and thankful. Appointment copy was mailed out.

## 2025-02-11 NOTE — TELEPHONE ENCOUNTER
----- Message from Sarah sent at 2/11/2025 10:02 AM CST -----  Regarding: Sooner Appt  Contact: Patient  Type:  Sooner Apoointment Request    Caller is requesting a sooner appointment.  Caller declined first available appointment listed below.  Caller will not accept being placed on the waitlist and is requesting a message be sent to doctor.  Name of Caller:Patient   When is the first available appointment? 04/03/2025   Symptoms: test results follow up   Would the patient rather a call back or a response via [facility]? Call back   Best Call Back Number: [phone]  Additional Information: Patient completed labs and US for physician on 02/11/2025 Patient would like to schedule a virtual appt to discuss results Please Assist

## 2025-02-12 ENCOUNTER — TELEPHONE (OUTPATIENT)
Dept: HEPATOLOGY | Facility: CLINIC | Age: 70
End: 2025-02-12
Payer: MEDICARE

## 2025-02-12 NOTE — TELEPHONE ENCOUNTER
Pt was informed about all the results lab and us. Pt also got schedule for a repeat lab and us appointment in Aug per Dr. Tolliver. Mailed out appointment copy to pt.

## 2025-02-12 NOTE — TELEPHONE ENCOUNTER
"----- Message from Christian sent at 2/12/2025 10:04 AM CST -----  Consult/Advisory    Name Of Caller: Self    Contact Preference?: [phone]     What is the nature of the call?: Returning call to Maru    Additional Notes:  "Thank you for all that you do for our patients"  "

## 2025-02-12 NOTE — TELEPHONE ENCOUNTER
----- Message from Emilie Tolliver MD sent at 2/11/2025 10:38 PM CST -----  Please inform patient:  Ultrasound showed cirrhosis,   No tumor in the liver.    Repeat u/s and AFP every 6 months, next in August 2025.

## 2025-04-04 ENCOUNTER — TELEPHONE (OUTPATIENT)
Dept: HEPATOLOGY | Facility: CLINIC | Age: 70
End: 2025-04-04

## 2025-04-04 ENCOUNTER — OFFICE VISIT (OUTPATIENT)
Dept: HEPATOLOGY | Facility: CLINIC | Age: 70
End: 2025-04-04
Payer: MEDICARE

## 2025-04-04 VITALS
WEIGHT: 117.94 LBS | TEMPERATURE: 98 F | DIASTOLIC BLOOD PRESSURE: 64 MMHG | HEART RATE: 82 BPM | RESPIRATION RATE: 18 BRPM | SYSTOLIC BLOOD PRESSURE: 141 MMHG | BODY MASS INDEX: 25.45 KG/M2 | HEIGHT: 57 IN | OXYGEN SATURATION: 99 %

## 2025-04-04 DIAGNOSIS — K74.60 CIRRHOSIS OF LIVER WITHOUT ASCITES, UNSPECIFIED HEPATIC CIRRHOSIS TYPE: Primary | ICD-10-CM

## 2025-04-04 PROCEDURE — 99999 PR PBB SHADOW E&M-EST. PATIENT-LVL III: CPT | Mod: PBBFAC,,, | Performed by: INTERNAL MEDICINE

## 2025-04-04 RX ORDER — VIT C/E/ZN/COPPR/LUTEIN/ZEAXAN 250MG-90MG
CAPSULE ORAL
COMMUNITY

## 2025-04-04 RX ORDER — LEVOCETIRIZINE DIHYDROCHLORIDE 5 MG/1
5 TABLET, FILM COATED ORAL
COMMUNITY

## 2025-04-04 RX ORDER — PEN NEEDLE, DIABETIC 32GX 5/32"
NEEDLE, DISPOSABLE MISCELLANEOUS 4 TIMES DAILY
COMMUNITY
Start: 2024-11-29

## 2025-04-04 RX ORDER — IPRATROPIUM BROMIDE 42 UG/1
2 SPRAY, METERED NASAL
COMMUNITY

## 2025-04-04 RX ORDER — INSULIN LISPRO 100 [IU]/ML
INJECTION, SOLUTION INTRAVENOUS; SUBCUTANEOUS
COMMUNITY
Start: 2025-02-24

## 2025-04-04 RX ORDER — MONTELUKAST SODIUM 10 MG/1
1 TABLET ORAL NIGHTLY
COMMUNITY

## 2025-04-04 RX ORDER — FLUTICASONE PROPIONATE 50 MCG
1 SPRAY, SUSPENSION (ML) NASAL
COMMUNITY

## 2025-04-04 RX ORDER — BLOOD-GLUCOSE,RECEIVER,CONT
EACH MISCELLANEOUS
COMMUNITY
Start: 2024-06-24

## 2025-04-04 NOTE — PROGRESS NOTES
Hepatology clinic [facility] [place]    4/4/2025      Subjective:     Jadyn Calderon is here for follow up of cirrhosis.      HPI  Since Jadyn Calderon's last visit 9/6/24.     Patient had lost weight to 104 lb, but regained to 117 lb.  She has been increasing her protein intake as well as supplementation and her weight is back to her usual.  She also has noticed decrease in muscle mass and now she has working with a . Overall feels well.   She does become tearful during the interview because of concerns around her medical issues and limitations as relates to the things that she can do and eat because of her medical conditions.  She reports that for the most part she remains active and stays busy but at times it can feel overwhelming for her.      No evidence of liver decompensation: no ascites, no GI bleeding.   Had one episode of confusion, did not need to go the hosp.          Review of Systems    Objective:     Physical Exam  Vitals reviewed.   Constitutional:       General: She is not in acute distress.     Appearance: She is well-developed.   HENT:      Head: Normocephalic and atraumatic.      Mouth/Throat:      Pharynx: No oropharyngeal exudate.   Eyes:      General: No scleral icterus.        Right eye: No discharge.         Left eye: No discharge.      Conjunctiva/sclera: Conjunctivae normal.      Pupils: Pupils are equal, round, and reactive to light.   Pulmonary:      Effort: Pulmonary effort is normal. No respiratory distress.      Breath sounds: Normal breath sounds. No wheezing.   Abdominal:      General: There is no distension.      Palpations: Abdomen is soft.      Tenderness: There is no abdominal tenderness.   Musculoskeletal:      Right lower leg: No edema.      Left lower leg: No edema.   Neurological:      Mental Status: She is alert and oriented to person, place, and time.   Psychiatric:         Behavior: Behavior normal.         MELD 3.0: 9 at 2/11/2025  9:40 AM  MELD-Na: 8 at 2/11/2025  9:40 AM  Calculated  from:  Serum Creatinine: 0.7 mg/dL (Using min of 1 mg/dL) at 2/11/2025  9:40 AM  Serum Sodium: 140 mmol/L (Using max of 137 mmol/L) at 2/11/2025  9:40 AM  Total Bilirubin: 1.1 mg/dL at 2/11/2025  9:40 AM  Serum Albumin: 3.4 g/dL at 2/11/2025  9:40 AM  INR(ratio): 1.1 at 2/11/2025  9:40 AM  Age at listing (hypothetical): 69 years  Sex: Female at 2/11/2025  9:40 AM      WBC   Date Value Ref Range Status   02/11/2025 4.82 3.90 - 12.70 K/uL Final     Hemoglobin   Date Value Ref Range Status   02/11/2025 12.9 12.0 - 16.0 g/dL Final     Hematocrit   Date Value Ref Range Status   02/11/2025 37.2 37.0 - 48.5 % Final     Platelets   Date Value Ref Range Status   02/11/2025 103 (L) 150 - 450 K/uL Final     BUN   Date Value Ref Range Status   02/11/2025 15 8 - 23 mg/dL Final     Creatinine   Date Value Ref Range Status   02/11/2025 0.7 0.5 - 1.4 mg/dL Final     Glucose   Date Value Ref Range Status   02/11/2025 130 (H) 70 - 110 mg/dL Final     Calcium   Date Value Ref Range Status   02/11/2025 9.3 8.7 - 10.5 mg/dL Final     Sodium   Date Value Ref Range Status   02/11/2025 140 136 - 145 mmol/L Final     Potassium   Date Value Ref Range Status   02/11/2025 4.4 3.5 - 5.1 mmol/L Final     Chloride   Date Value Ref Range Status   02/11/2025 110 95 - 110 mmol/L Final     AST   Date Value Ref Range Status   02/11/2025 36 10 - 40 U/L Final     ALT   Date Value Ref Range Status   02/11/2025 31 10 - 44 U/L Final     Alkaline Phosphatase   Date Value Ref Range Status   02/11/2025 87 40 - 150 U/L Final     Total Bilirubin   Date Value Ref Range Status   02/11/2025 1.1 (H) 0.1 - 1.0 mg/dL Final     Comment:     For infants and newborns, interpretation of results should be based  on gestational age, weight and in agreement with clinical  observations.    Premature Infant recommended reference ranges:  Up to 24 hours.............<8.0 mg/dL  Up to 48 hours............<12.0 mg/dL  3-5 days..................<15.0 mg/dL  6-29  days.................<15.0 mg/dL       Albumin   Date Value Ref Range Status   02/11/2025 3.4 (L) 3.5 - 5.2 g/dL Final     INR   Date Value Ref Range Status   02/11/2025 1.1 0.8 - 1.2 Final     Comment:     Coumadin Therapy:  2.0 - 3.0 for INR for all indicators except mechanical heart valves  and antiphospholipid syndromes which should use 2.5 - 3.5.       US 2/11/25:  FINDINGS:  1. Findings consistent with cirrhosis.  No focal liver lesions.  2. Mild splenomegaly.  .      Assessment/Plan:     No diagnosis found.    Jadyn Calderon is a 70 y.o. female withNo chief complaint on file.    ALLERGIC TO SPIRONOLACTONE    Doesn't want a liver transplant    Other cirrhosis of liver-  Low MELD; patient has the 1st signs of minor decompensation in the form of trace ascites on ultrasound but nothing evident clinically.    MELD 3.0: 9 at 2/11/2025  9:40 AM  MELD-Na: 8 at 2/11/2025  9:40 AM  Calculated from:  Serum Creatinine: 0.7 mg/dL (Using min of 1 mg/dL) at 2/11/2025  9:40 AM  Serum Sodium: 140 mmol/L (Using max of 137 mmol/L) at 2/11/2025  9:40 AM  Total Bilirubin: 1.1 mg/dL at 2/11/2025  9:40 AM  Serum Albumin: 3.4 g/dL at 2/11/2025  9:40 AM  INR(ratio): 1.1 at 2/11/2025  9:40 AM  Age at listing (hypothetical): 69 years  Sex: Female at 2/11/2025  9:40 AM     -  Continue to monitor meld  -  Continue HCC surveillance - neg 2/11/25 ultrasound no focal lesion, AFP 2.4 2/11/25.    -     US Abdomen Limited; Future; Expected date: Aug 2025  -     Comprehensive Metabolic Panel; Future; Expected date: Aug 2025  -     CBC Auto Differential; Future; Expected date: Aug 2025  -     AFP Tumor Marker; Future; Expected date: Aug 2025  -     Protime-INR; Future; Expected date: Aug 2025    Secondary esophageal varices without bleeding- continue secondary prophylaxis  - continue on beta-blocker  - she can continue with yearly endoscopies with her gastroenterologist, Dr. Monaco    Adjustment disorder, unspecified type- suspect patient is having  difficulty with managing her diagnoses.  Based on her tearful in his at today's visit I did recommend that she consider seeing a therapist to help with her emotions and coping.    Other ascites-  Minimal; will not start diuretics at this time  - advised patient maintain a low-salt diet  - monitor weights and clinically for increasing fluid; advised that she contact me if there is any concern and we can consider starting diuretics    PLAN:  -     US Abdomen Limited; Future; Expected date: Aug 2025  -     Comprehensive Metabolic Panel; Future; Expected date: Aug 2025  -     CBC Auto Differential; Future; Expected date: Aug 2025  -     AFP Tumor Marker; Future; Expected date: Aug 2025  -     Protime-INR; Future; Expected date: Aug 2025  RTC in 6 months with preclinic labs and imaging    Emilie Tollvier MD

## 2025-04-04 NOTE — Clinical Note
PLAN: -     US Abdomen Limited; Future; Expected date: Aug 2025 -     Comprehensive Metabolic Panel; Future; Expected date: Aug 2025 -     CBC Auto Differential; Future; Expected date: Aug 2025 -     AFP Tumor Marker; Future; Expected date: Aug 2025 -     Protime-INR; Future; Expected date: Aug 2025 RTC in 6 months with preclinic labs and imaging

## 2025-04-04 NOTE — TELEPHONE ENCOUNTER
----- Message from Emilie Tolliver MD sent at 4/4/2025 12:19 PM CDT -----  PLAN:  -     US Abdomen Limited; Future; Expected date: Aug 2025  -     Comprehensive Metabolic Panel; Future; Expected date: Aug 2025  -     CBC Auto Differential; Future; Expected date: Aug 2025  -     AFP Tumor Marker; Future; Expected date: Aug 2025  -     Protime-INR; Future; Expected date: Aug 2025  RTC in 6 months with preclinic labs and imaging

## 2025-08-11 ENCOUNTER — HOSPITAL ENCOUNTER (OUTPATIENT)
Dept: RADIOLOGY | Facility: HOSPITAL | Age: 70
Discharge: HOME | End: 2025-08-11
Attending: INTERNAL MEDICINE
Payer: MEDICARE

## 2025-08-11 DIAGNOSIS — K74.60 CIRRHOSIS OF LIVER WITHOUT ASCITES, UNSPECIFIED HEPATIC CIRRHOSIS TYPE: ICD-10-CM

## 2025-08-11 PROCEDURE — 76705 ECHO EXAM OF ABDOMEN: CPT | Mod: 26,,, | Performed by: RADIOLOGY

## 2025-08-11 PROCEDURE — 76705 ECHO EXAM OF ABDOMEN: CPT | Mod: TC

## 2025-08-18 ENCOUNTER — OFFICE VISIT (OUTPATIENT)
Dept: HEPATOLOGY | Facility: CLINIC | Age: 70
End: 2025-08-18
Payer: MEDICARE

## 2025-08-18 DIAGNOSIS — R74.8 ELEVATED LIVER ENZYMES: ICD-10-CM

## 2025-08-18 DIAGNOSIS — K75.81 NONALCOHOLIC STEATOHEPATITIS: ICD-10-CM

## 2025-08-18 DIAGNOSIS — K74.60 CIRRHOSIS OF LIVER WITHOUT ASCITES, UNSPECIFIED HEPATIC CIRRHOSIS TYPE: Primary | ICD-10-CM

## 2025-08-18 PROCEDURE — 3044F HG A1C LEVEL LT 7.0%: CPT | Mod: CPTII,95,, | Performed by: INTERNAL MEDICINE

## 2025-08-18 PROCEDURE — 98005 SYNCH AUDIO-VIDEO EST LOW 20: CPT | Mod: 95,,, | Performed by: INTERNAL MEDICINE

## 2025-08-19 ENCOUNTER — TELEPHONE (OUTPATIENT)
Dept: HEPATOLOGY | Facility: CLINIC | Age: 70
End: 2025-08-19
Payer: MEDICARE

## 2025-08-19 DIAGNOSIS — K74.60 CIRRHOSIS OF LIVER WITHOUT ASCITES, UNSPECIFIED HEPATIC CIRRHOSIS TYPE: Primary | ICD-10-CM
